# Patient Record
Sex: MALE | NOT HISPANIC OR LATINO | Employment: PART TIME | URBAN - METROPOLITAN AREA
[De-identification: names, ages, dates, MRNs, and addresses within clinical notes are randomized per-mention and may not be internally consistent; named-entity substitution may affect disease eponyms.]

---

## 2023-04-19 LAB
ALANINE AMINOTRANSFERASE (SGPT) (U/L) IN SER/PLAS: 28 U/L (ref 10–52)
ALBUMIN (G/DL) IN SER/PLAS: 4.2 G/DL (ref 3.4–5)
ALKALINE PHOSPHATASE (U/L) IN SER/PLAS: 45 U/L (ref 33–136)
ANION GAP IN SER/PLAS: 12 MMOL/L (ref 10–20)
ASPARTATE AMINOTRANSFERASE (SGOT) (U/L) IN SER/PLAS: 24 U/L (ref 9–39)
BILIRUBIN TOTAL (MG/DL) IN SER/PLAS: 0.4 MG/DL (ref 0–1.2)
CALCIUM (MG/DL) IN SER/PLAS: 9.1 MG/DL (ref 8.6–10.3)
CARBON DIOXIDE, TOTAL (MMOL/L) IN SER/PLAS: 27 MMOL/L (ref 21–32)
CHLORIDE (MMOL/L) IN SER/PLAS: 97 MMOL/L (ref 98–107)
CREATININE (MG/DL) IN SER/PLAS: 0.87 MG/DL (ref 0.5–1.3)
ESTIMATED AVERAGE GLUCOSE FOR HBA1C: 206 MG/DL
GFR MALE: >90 ML/MIN/1.73M2
GLUCOSE (MG/DL) IN SER/PLAS: 141 MG/DL (ref 74–99)
HEMOGLOBIN A1C/HEMOGLOBIN TOTAL IN BLOOD: 8.8 %
POTASSIUM (MMOL/L) IN SER/PLAS: 4.2 MMOL/L (ref 3.5–5.3)
PROTEIN TOTAL: 7.6 G/DL (ref 6.4–8.2)
SODIUM (MMOL/L) IN SER/PLAS: 132 MMOL/L (ref 136–145)
UREA NITROGEN (MG/DL) IN SER/PLAS: 10 MG/DL (ref 6–23)

## 2023-10-05 ENCOUNTER — HOSPITAL ENCOUNTER (EMERGENCY)
Facility: HOSPITAL | Age: 63
Discharge: HOME | End: 2023-10-05
Attending: EMERGENCY MEDICINE | Admitting: EMERGENCY MEDICINE
Payer: COMMERCIAL

## 2023-10-05 VITALS
SYSTOLIC BLOOD PRESSURE: 124 MMHG | HEART RATE: 74 BPM | WEIGHT: 245 LBS | HEIGHT: 72 IN | TEMPERATURE: 98.1 F | DIASTOLIC BLOOD PRESSURE: 80 MMHG | OXYGEN SATURATION: 98 % | RESPIRATION RATE: 18 BRPM | BODY MASS INDEX: 33.18 KG/M2

## 2023-10-05 DIAGNOSIS — T63.441A BEE STING, ACCIDENTAL OR UNINTENTIONAL, INITIAL ENCOUNTER: Primary | ICD-10-CM

## 2023-10-05 PROCEDURE — 99281 EMR DPT VST MAYX REQ PHY/QHP: CPT | Performed by: EMERGENCY MEDICINE

## 2023-10-05 ASSESSMENT — PAIN SCALES - GENERAL
PAINLEVEL_OUTOF10: 0 - NO PAIN
PAINLEVEL_OUTOF10: 0 - NO PAIN

## 2023-10-05 ASSESSMENT — LIFESTYLE VARIABLES
HAVE PEOPLE ANNOYED YOU BY CRITICIZING YOUR DRINKING: NO
HAVE YOU EVER FELT YOU SHOULD CUT DOWN ON YOUR DRINKING: NO
EVER FELT BAD OR GUILTY ABOUT YOUR DRINKING: NO
EVER HAD A DRINK FIRST THING IN THE MORNING TO STEADY YOUR NERVES TO GET RID OF A HANGOVER: NO

## 2023-10-05 ASSESSMENT — COLUMBIA-SUICIDE SEVERITY RATING SCALE - C-SSRS
1. IN THE PAST MONTH, HAVE YOU WISHED YOU WERE DEAD OR WISHED YOU COULD GO TO SLEEP AND NOT WAKE UP?: NO
2. HAVE YOU ACTUALLY HAD ANY THOUGHTS OF KILLING YOURSELF?: NO
6. HAVE YOU EVER DONE ANYTHING, STARTED TO DO ANYTHING, OR PREPARED TO DO ANYTHING TO END YOUR LIFE?: NO

## 2023-10-05 ASSESSMENT — PAIN - FUNCTIONAL ASSESSMENT: PAIN_FUNCTIONAL_ASSESSMENT: 0-10

## 2023-10-05 NOTE — DISCHARGE INSTRUCTIONS
Bee, Wasp, or Hornet Sting, Adult  Bees, wasps, and hornets are part of a family of insects that sting. Normally, a sting will cause pain, redness, and swelling at the sting site. However, some people have an allergy to these stings, and their reactions can be much more serious.    What increases the risk?  You may be at a greater risk of getting stung if you:  Provoke a stinging insect by swatting or disturbing it.  Wear strong-smelling soaps, deodorants, or body sprays.  Spend time outdoors near gardens with flowers or fruit trees or in clothes that expose skin.  Eat or drink outside.  What are the signs or symptoms?  The reaction to an insect sting can vary from a mild, normal response to life-threatening anaphylaxis. The sting site is often a red lump in the skin, sometimes with a tiny hole in the center, that may still have the stinger in the center of the wound.    Normal reaction    A normal reaction is experienced by most people after an insect sting. Symptoms include:  Pain, redness, and swelling at the sting site. These can develop over 24-48 hours.  Pain, redness, and swelling that may spread to a larger, connected area beyond the sting site. The spreading can continue over 24-48 hours.  Allergic reaction    An allergic reaction can vary in severity and includes symptoms in other areas of the body beyond the sting site. People who experience an allergic reaction have a higher risk of having similar or worse symptoms the next time they are stung. Symptoms may include:  Hives, itching, and swelling.  Abdominal symptoms including cramping, nausea, vomiting, and diarrhea.  Severe symptoms that require immediate medical attention include:  Chest pain or tightness.  Wheezing or trouble breathing.  Swelling of the tongue, throat, or lips.  Trouble swallowing or hoarse voice.  Anaphylactic reaction    An anaphylactic reaction is a severe, life-threatening allergy and requires immediate medical attention. The  "symptoms often include severe allergic reaction symptoms that develop rapidly and lead to:  A sudden and sharp drop in blood pressure.  Dizziness.  Loss of consciousness.  How is this diagnosed?  This condition is usually diagnosed based on your symptoms and medical history as well as a physical exam. You may have an allergy test to determine if you are allergic to the insect venom.    How is this treated?  If you were stung by a bee, the stinger and a small sac of venom may be in the wound. Remove the stinger as soon as possible. Do this by brushing across the wound with gauze, a clean fingernail, or a flat card such as a credit card. This can help reduce the severity of your body's reaction to the sting.    Normal reactions can be treated with:  Washing the area thoroughly with soap and water.  Applying ice to the area to reduce swelling.  Oral or topical medicines to help reduce pain and itching, if present.  Pay close attention to your symptoms after you have been stung. If possible, have someone stay with you to see if an allergic reaction develops. If allergic symptoms develop, oral antihistamines can be taken and you will need medical help right away.    If you had an allergic reaction before, you may need to:  Use an auto-injector \"pen\"(pre-filled automatic epinephrine injection device)at the first sign of an allergic reaction.  Get medical help right away because the epinephrine is short-acting. It is intended to give you more time to get to an emergency room.  Follow these instructions at home:  Bag of ice on a towel on the skin.   Wash the sting site 2-3 times a day with soap and water as told by your health care provider.  Apply or take over-the-counter and prescription medicines only as told by your health care provider.  If directed, apply ice to the sting area.  Put ice in a plastic bag.  Place a towel between your skin and the bag.  Leave the ice on for 20 minutes, 2-3 times a day.  Do not scratch " "the sting area.  If you had a severe allergic reaction to a sting, you may need to:  Wear a medical bracelet or necklace that lists the allergy.  Carry an anaphylaxis kit or an epinephrine auto-injector \"pen\" with you at all times. Tell your family members, friends, and coworkers when and how to use it. Use it at the first sign of an allergic reaction.  How is this prevented?  Avoid swatting at stinging insects and disturbing insect nests.  Do not use fragrant soaps or lotions and avoid sitting near flowering plants, if possible.  Wear shoes, pants, and long sleeves when spending time outdoors, especially in grassy areas where stinging insects are common.  Keep outdoor areas free from nests or hives.  Keep food and drink containers covered when eating outdoors.  Wear gloves if you are gardening or working outdoors.  Find a barrier between you and the insect(s), such as a door, if an attack by a stinging insect or a swarm seems likely.  Contact a health care provider if:  Your symptoms do not get better in 2-3 days.  You have redness, swelling, or pain that spreads beyond the area of the sting.  You have a fever.  Get help right away if:  You have symptoms of a severe allergic reaction. These include:  Chest tightness or pain.  Wheezing, or trouble swallowing or breathing.  Light-headedness, dizziness, or fainting.  Itchy, raised, red patches on the skin beyond the sting site.  Abdominal cramping, nausea, vomiting, or diarrhea.  Trouble swallowing or a swollen tongue, throat, or lips.  These symptoms may be an emergency. Get help right away. Call 911.  Do not wait to see if the symptoms will go away.  Do not drive yourself to the hospital.  Summary  Stings from bees, wasps, and hornets can cause pain and swelling, but they are usually not serious. However, some people may have an allergic reaction to a sting. This can cause the symptoms to be more severe.  Pay close attention to your symptoms after you have been " stung. If possible, have someone stay with you to look for signs of worsening symptoms.  Call your health care provider if you have any signs of an allergic reaction.  This information is not intended to replace advice given to you by your health care provider. Make sure you discuss any questions you have with your health care provider.

## 2023-10-05 NOTE — ED PROVIDER NOTES
Insect Bite (Stung 4 times. PT c/o itching/tingling. No CP/SOB)      HPI  Patient says earlier he was stung by a bee just above his right eye.  He says that he was at work and they encouraged him to come in and get evaluated patient has had swelling over the right eye for the last couple hours.  He denies any systemic symptoms no known history of severe life-threatening allergic reaction.  He has no difficulty breathing or swallowing no other symptoms patient came in per his company need to be evaluated but feels like he can be discharged home.    Social history:  Smoker    Physical Exam  Gen.: Vitals noted, no distress. Afebrile   Head: Normocephalic  ENT: Pupils equal, patent nares.  Normal oral mucosa.  Right periorbital area is moderately edematous right superior aspect greater than the inferior aspect slightly erythematous the actual orbit itself is not affected no discharge remainder the face is unremarkable oropharynx is clear no signs of edema no edema's extending into the neck.  Neck: Supple.   Cardiac: Regular rate rhythm   Lungs: Clear to auscultation bilaterally with good aeration  Abdomen: Soft, nontender, nonsurgical.   Back: No midline or paraspinal tenderness.    Extremities:  Moves all extremities.  Skin: No rash.   Neuro: No focal neurologic deficits.    No past medical history on file.  Labs Reviewed - No data to display   ED Course as of 10/05/23 1352   Thu Oct 05, 2023   1348 ED course was uneventful [DD]      ED Course User Index  [DD] Benny Nash MD         Diagnoses as of 10/05/23 1352   Bee sting, accidental or unintentional, initial encounter         Medical Decision Making  Patient with a localized allergic reaction secondary to bee sting do not believe that this is a severe anaphylactic reaction and hence does not need any type of emergent intervention patient will take Benadryl over-the-counter.     Benny Nash MD  10/05/23 1348       Benny Nash MD  10/05/23 1352

## 2023-12-11 DIAGNOSIS — E11.9 TYPE 2 DIABETES MELLITUS WITHOUT COMPLICATION, WITHOUT LONG-TERM CURRENT USE OF INSULIN (MULTI): Primary | ICD-10-CM

## 2023-12-11 RX ORDER — INSULIN GLARGINE AND LIXISENATIDE 100; 33 U/ML; UG/ML
INJECTION, SOLUTION SUBCUTANEOUS
Qty: 30 ML | Refills: 3 | Status: SHIPPED | OUTPATIENT
Start: 2023-12-11 | End: 2024-02-06 | Stop reason: SDUPTHER

## 2023-12-14 ENCOUNTER — TELEPHONE (OUTPATIENT)
Dept: PRIMARY CARE | Facility: CLINIC | Age: 63
End: 2023-12-14
Payer: COMMERCIAL

## 2023-12-14 DIAGNOSIS — G62.9 NEUROPATHY: ICD-10-CM

## 2023-12-14 DIAGNOSIS — E11.9 TYPE 2 DIABETES MELLITUS WITHOUT COMPLICATION, WITHOUT LONG-TERM CURRENT USE OF INSULIN (MULTI): Primary | ICD-10-CM

## 2023-12-15 ENCOUNTER — LAB (OUTPATIENT)
Dept: LAB | Facility: LAB | Age: 63
End: 2023-12-15
Payer: COMMERCIAL

## 2023-12-15 DIAGNOSIS — G62.9 NEUROPATHY: ICD-10-CM

## 2023-12-15 DIAGNOSIS — E11.9 TYPE 2 DIABETES MELLITUS WITHOUT COMPLICATION, WITHOUT LONG-TERM CURRENT USE OF INSULIN (MULTI): ICD-10-CM

## 2023-12-15 LAB
ALBUMIN SERPL BCP-MCNC: 4.3 G/DL (ref 3.4–5)
ALP SERPL-CCNC: 46 U/L (ref 33–136)
ALT SERPL W P-5'-P-CCNC: 22 U/L (ref 10–52)
ANION GAP SERPL CALC-SCNC: 14 MMOL/L (ref 10–20)
AST SERPL W P-5'-P-CCNC: 18 U/L (ref 9–39)
BILIRUB SERPL-MCNC: 0.5 MG/DL (ref 0–1.2)
BUN SERPL-MCNC: 14 MG/DL (ref 6–23)
CALCIUM SERPL-MCNC: 9.2 MG/DL (ref 8.6–10.3)
CHLORIDE SERPL-SCNC: 98 MMOL/L (ref 98–107)
CO2 SERPL-SCNC: 28 MMOL/L (ref 21–32)
CREAT SERPL-MCNC: 1.07 MG/DL (ref 0.5–1.3)
EST. AVERAGE GLUCOSE BLD GHB EST-MCNC: 197 MG/DL
GFR SERPL CREATININE-BSD FRML MDRD: 79 ML/MIN/1.73M*2
GLUCOSE SERPL-MCNC: 152 MG/DL (ref 74–99)
HBA1C MFR BLD: 8.5 %
POTASSIUM SERPL-SCNC: 4.8 MMOL/L (ref 3.5–5.3)
PROT SERPL-MCNC: 7.5 G/DL (ref 6.4–8.2)
SODIUM SERPL-SCNC: 135 MMOL/L (ref 136–145)
TSH SERPL-ACNC: 1.93 MIU/L (ref 0.44–3.98)

## 2023-12-15 PROCEDURE — 36415 COLL VENOUS BLD VENIPUNCTURE: CPT

## 2023-12-15 PROCEDURE — 83036 HEMOGLOBIN GLYCOSYLATED A1C: CPT

## 2023-12-15 PROCEDURE — 80053 COMPREHEN METABOLIC PANEL: CPT

## 2023-12-15 PROCEDURE — 84443 ASSAY THYROID STIM HORMONE: CPT

## 2024-01-10 DIAGNOSIS — E78.5 HYPERLIPIDEMIA, UNSPECIFIED HYPERLIPIDEMIA TYPE: Primary | ICD-10-CM

## 2024-01-10 RX ORDER — ROSUVASTATIN CALCIUM 10 MG/1
10 TABLET, COATED ORAL DAILY
Qty: 90 TABLET | Refills: 3 | Status: SHIPPED | OUTPATIENT
Start: 2024-01-10 | End: 2024-02-06 | Stop reason: SDUPTHER

## 2024-01-16 DIAGNOSIS — F32.A DEPRESSION, UNSPECIFIED DEPRESSION TYPE: Primary | ICD-10-CM

## 2024-01-16 RX ORDER — CITALOPRAM 20 MG/1
20 TABLET, FILM COATED ORAL DAILY
Qty: 60 TABLET | Refills: 5 | Status: SHIPPED | OUTPATIENT
Start: 2024-01-16 | End: 2024-02-06 | Stop reason: SDUPTHER

## 2024-02-01 DIAGNOSIS — E11.9 TYPE 2 DIABETES MELLITUS WITHOUT COMPLICATION, UNSPECIFIED WHETHER LONG TERM INSULIN USE (MULTI): Primary | ICD-10-CM

## 2024-02-01 RX ORDER — PEN NEEDLE, DIABETIC 31 GX5/16"
NEEDLE, DISPOSABLE MISCELLANEOUS DAILY
Qty: 100 EACH | Refills: 3 | Status: SHIPPED | OUTPATIENT
Start: 2024-02-01 | End: 2024-02-06 | Stop reason: SDUPTHER

## 2024-02-01 RX ORDER — PEN NEEDLE, DIABETIC 31 GX5/16"
NEEDLE, DISPOSABLE MISCELLANEOUS DAILY
COMMUNITY
Start: 2023-11-01

## 2024-02-04 DIAGNOSIS — E11.9 TYPE 2 DIABETES MELLITUS WITHOUT COMPLICATION, WITHOUT LONG-TERM CURRENT USE OF INSULIN (MULTI): Primary | ICD-10-CM

## 2024-02-05 RX ORDER — METFORMIN HYDROCHLORIDE 500 MG/1
500 TABLET, EXTENDED RELEASE ORAL 2 TIMES DAILY
Qty: 120 TABLET | Refills: 1 | Status: SHIPPED | OUTPATIENT
Start: 2024-02-05 | End: 2024-02-06 | Stop reason: SDUPTHER

## 2024-02-06 ENCOUNTER — OFFICE VISIT (OUTPATIENT)
Dept: PRIMARY CARE | Facility: CLINIC | Age: 64
End: 2024-02-06
Payer: COMMERCIAL

## 2024-02-06 VITALS
HEART RATE: 71 BPM | OXYGEN SATURATION: 97 % | DIASTOLIC BLOOD PRESSURE: 91 MMHG | WEIGHT: 245.8 LBS | HEIGHT: 72 IN | BODY MASS INDEX: 33.29 KG/M2 | TEMPERATURE: 98 F | SYSTOLIC BLOOD PRESSURE: 144 MMHG

## 2024-02-06 DIAGNOSIS — F32.A DEPRESSION, UNSPECIFIED DEPRESSION TYPE: ICD-10-CM

## 2024-02-06 DIAGNOSIS — F17.219 CIGARETTE NICOTINE DEPENDENCE WITH NICOTINE-INDUCED DISORDER: ICD-10-CM

## 2024-02-06 DIAGNOSIS — E78.5 HYPERLIPIDEMIA, UNSPECIFIED HYPERLIPIDEMIA TYPE: ICD-10-CM

## 2024-02-06 DIAGNOSIS — E11.9 TYPE 2 DIABETES MELLITUS WITHOUT COMPLICATION, UNSPECIFIED WHETHER LONG TERM INSULIN USE (MULTI): ICD-10-CM

## 2024-02-06 DIAGNOSIS — Z00.00 ENCOUNTER FOR HEALTH MAINTENANCE EXAMINATION IN ADULT: ICD-10-CM

## 2024-02-06 DIAGNOSIS — E11.9 TYPE 2 DIABETES MELLITUS WITHOUT COMPLICATION, WITHOUT LONG-TERM CURRENT USE OF INSULIN (MULTI): ICD-10-CM

## 2024-02-06 DIAGNOSIS — Z12.11 COLON CANCER SCREENING: Primary | ICD-10-CM

## 2024-02-06 PROBLEM — L98.499 CALLOUS ULCER (MULTI): Status: ACTIVE | Noted: 2024-02-06

## 2024-02-06 PROBLEM — R91.1 SOLITARY PULMONARY NODULE: Status: ACTIVE | Noted: 2024-02-06

## 2024-02-06 PROBLEM — H93.19 TINNITUS: Status: ACTIVE | Noted: 2024-02-06

## 2024-02-06 PROBLEM — H91.93 BILATERAL HEARING LOSS: Status: ACTIVE | Noted: 2024-02-06

## 2024-02-06 PROBLEM — F41.9 ANXIETY: Status: ACTIVE | Noted: 2024-02-06

## 2024-02-06 PROCEDURE — 3008F BODY MASS INDEX DOCD: CPT | Performed by: FAMILY MEDICINE

## 2024-02-06 PROCEDURE — 3077F SYST BP >= 140 MM HG: CPT | Performed by: FAMILY MEDICINE

## 2024-02-06 PROCEDURE — 99396 PREV VISIT EST AGE 40-64: CPT | Performed by: FAMILY MEDICINE

## 2024-02-06 PROCEDURE — 3080F DIAST BP >= 90 MM HG: CPT | Performed by: FAMILY MEDICINE

## 2024-02-06 RX ORDER — INSULIN GLARGINE AND LIXISENATIDE 100; 33 U/ML; UG/ML
INJECTION, SOLUTION SUBCUTANEOUS
Qty: 30 ML | Refills: 3 | Status: SHIPPED | OUTPATIENT
Start: 2024-02-06

## 2024-02-06 RX ORDER — BLOOD-GLUCOSE SENSOR
EACH MISCELLANEOUS
Qty: 1 EACH | Refills: 3 | Status: SHIPPED | OUTPATIENT
Start: 2024-02-06 | End: 2024-02-11

## 2024-02-06 RX ORDER — PEN NEEDLE, DIABETIC 30 GX3/16"
1 NEEDLE, DISPOSABLE MISCELLANEOUS DAILY
Qty: 100 EACH | Refills: 3 | Status: SHIPPED | OUTPATIENT
Start: 2024-02-06

## 2024-02-06 RX ORDER — ROSUVASTATIN CALCIUM 10 MG/1
10 TABLET, COATED ORAL DAILY
Qty: 90 TABLET | Refills: 3 | Status: SHIPPED | OUTPATIENT
Start: 2024-02-06

## 2024-02-06 RX ORDER — METHOCARBAMOL 750 MG/1
TABLET, FILM COATED ORAL
COMMUNITY
Start: 2023-07-21

## 2024-02-06 RX ORDER — CITALOPRAM 20 MG/1
20 TABLET, FILM COATED ORAL DAILY
Qty: 60 TABLET | Refills: 5 | Status: SHIPPED | OUTPATIENT
Start: 2024-02-06

## 2024-02-06 RX ORDER — BLOOD-GLUCOSE SENSOR
EACH MISCELLANEOUS
Qty: 1 EACH | Refills: 3 | Status: SHIPPED | OUTPATIENT
Start: 2024-02-06 | End: 2024-02-06

## 2024-02-06 RX ORDER — METFORMIN HYDROCHLORIDE 500 MG/1
500 TABLET, EXTENDED RELEASE ORAL 2 TIMES DAILY
Qty: 120 TABLET | Refills: 1 | Status: SHIPPED | OUTPATIENT
Start: 2024-02-06

## 2024-02-06 ASSESSMENT — PATIENT HEALTH QUESTIONNAIRE - PHQ9
5. POOR APPETITE OR OVEREATING: NEARLY EVERY DAY
9. THOUGHTS THAT YOU WOULD BE BETTER OFF DEAD, OR OF HURTING YOURSELF: NOT AT ALL
8. MOVING OR SPEAKING SO SLOWLY THAT OTHER PEOPLE COULD HAVE NOTICED. OR THE OPPOSITE, BEING SO FIGETY OR RESTLESS THAT YOU HAVE BEEN MOVING AROUND A LOT MORE THAN USUAL: NOT AT ALL
10. IF YOU CHECKED OFF ANY PROBLEMS, HOW DIFFICULT HAVE THESE PROBLEMS MADE IT FOR YOU TO DO YOUR WORK, TAKE CARE OF THINGS AT HOME, OR GET ALONG WITH OTHER PEOPLE: SOMEWHAT DIFFICULT
7. TROUBLE CONCENTRATING ON THINGS, SUCH AS READING THE NEWSPAPER OR WATCHING TELEVISION: NOT AT ALL
2. FEELING DOWN, DEPRESSED OR HOPELESS: SEVERAL DAYS
SUM OF ALL RESPONSES TO PHQ QUESTIONS 1-9: 13
3. TROUBLE FALLING OR STAYING ASLEEP OR SLEEPING TOO MUCH: NEARLY EVERY DAY
4. FEELING TIRED OR HAVING LITTLE ENERGY: MORE THAN HALF THE DAYS
SUM OF ALL RESPONSES TO PHQ9 QUESTIONS 1 AND 2: 4
6. FEELING BAD ABOUT YOURSELF - OR THAT YOU ARE A FAILURE OR HAVE LET YOURSELF OR YOUR FAMILY DOWN: SEVERAL DAYS
1. LITTLE INTEREST OR PLEASURE IN DOING THINGS: NEARLY EVERY DAY

## 2024-02-06 ASSESSMENT — PAIN SCALES - GENERAL: PAINLEVEL: 0-NO PAIN

## 2024-02-06 NOTE — PROGRESS NOTES
"Subjective   Patient ID: Curtis Shah is a 64 y.o. male who presents for Annual Exam (Physical and bloodwork had recent bloodwork not sure if its what he needs for physical).    HPI  TIIDM, insulin dependent   Last A1c was 8.5%  Patient takes 20 units of Soliqua and metformin 500 mg twice daily, however patient sometimes only takes 500 mg once daily  Patient needs refills on his medication    2. Hyperlipidemia  Patient taking rosuvastatin 10mg   Patient tolerating well     3.  Depression  Doing well on citalopram 20 mg    4. Preventative:  Working on a making diet changes.  Does not exercise regularly.  Due for colon cancer screening. Did Cologuard over 5 years ago  Vaccines are not up to date; they are due for: Declines shingles, Flu  Dental exam is up to date.  Vision exam is up to date.    Social history:  -Smokin pack year smoking history  -Alcohol: Rare alcohol use  -Occupation: Retired, worked for [employer]    Review of Systems  All pertinent positive symptoms are included in the history of present illness.    All other systems have been reviewed and are negative and noncontributory to this patient's current ailments.     Social History     Tobacco Use    Smoking status: Every Day     Packs/day: 1     Types: Cigarettes    Smokeless tobacco: Never   Substance Use Topics    Alcohol use: Not Currently      No past surgical history on file.   No Known Allergies     Current Outpatient Medications   Medication Sig Dispense Refill    methocarbamol (Robaxin) 750 mg tablet TAKE 1 TABLET BY MOUTH EVERY 6 HOURS AS NEEDED FOR UP TO 3 DAYS.      BD Ultra-Fine Short Pen Needle 31 gauge x 5/16\" needle USE ONCE DAILY 100 each 3    BD Ultra-Fine Short Pen Needle 31 gauge x 5/16\" needle once daily.      citalopram (CeleXA) 20 mg tablet TAKE 1 TABLET BY MOUTH DAILY 60 tablet 5    insulin glargine-lixisenatide (Soliqua 100/33) 100 unit-33 mcg/mL insulin pen INJECT 15 UNITS ONCE DAILY 30 mL 3    metFORMIN  mg 24 hr " tablet TAKE 1 TABLET BY MOUTH TWICE A  tablet 1    rosuvastatin (Crestor) 10 mg tablet TAKE 1 TABLET BY MOUTH DAILY 90 tablet 3     No current facility-administered medications for this visit.        Patient Active Problem List   Diagnosis    Anxiety    Bilateral hearing loss    Callous ulcer (CMS/HCC)    Depressive disorder    Dyslipidemia    Hyperlipidemia    Solitary pulmonary nodule    Tinnitus    Type 2 diabetes mellitus (CMS/HCC)        There is no immunization history on file for this patient.    Objective   VITALS  BP (!) 144/91   Pulse 71   Temp 36.7 °C (98 °F)   Ht 1.829 m (6')   Wt 111 kg (245 lb 12.8 oz)   SpO2 97%   BMI 33.34 kg/m²      Lab Results   Component Value Date    WBC 7.9 01/23/2023    HGB 16.0 01/23/2023    HCT 45.8 01/23/2023    MCV 94 01/23/2023     01/23/2023      Lab Results   Component Value Date    GLUCOSE 152 (H) 12/15/2023    CALCIUM 9.2 12/15/2023     (L) 12/15/2023    K 4.8 12/15/2023    CO2 28 12/15/2023    CL 98 12/15/2023    BUN 14 12/15/2023    CREATININE 1.07 12/15/2023     Lab Results   Component Value Date    CHOL 193 01/23/2023    CHOL 200 08/17/2021    CHOL 161 06/13/2018     Lab Results   Component Value Date    HDL 36.5 (A) 01/23/2023    HDL 40 (L) 08/17/2021    HDL 38.7 (A) 06/13/2018     Lab Results   Component Value Date    LDLCALC 120 08/17/2021     Lab Results   Component Value Date    TRIG 236 (H) 01/23/2023    TRIG 199 (H) 08/17/2021    TRIG 132 06/13/2018     Physical Exam  CONSTITUTIONAL - In no acute distress, not ill-appearing, and not tired appearing  SKIN - normal skin color and pigmentation, normal skin turgor without rash, lesions, or nodules visualized  HEAD - no trauma, normocephalic  EYES - PERRL, EOMI  ENT - TM's intact, no injection or signs of infection, uvula midline, normal tongue movement and throat normal, no exudate, nasal passage without discharge and patent  NECK - supple without rigidity, no neck mass was observed, no  "thyromegaly or thyroid nodules  PULMONARY - clear to auscultation, no wheezing, no crackles and no rales, normal respiratory effort  CARDIAC - regular rate and regular rhythm, no skipped beats, no murmur  ABDOMEN - no organomegaly, soft, nontender, nondistended, normal bowel sounds, no guarding/rebound/rigidity  EXTREMITIES - no edema, no deformities  NEUROLOGICAL - alert, oriented and no focal deficits  PSYCHIATRIC - normal mood and behavior  IMMUNOLOGIC - no cervical lymphadenopathy     Assessment/Plan   Curtis is a 64-year-old male with history of insulin-dependent type 2 diabetes, hyperlipidemia, depression, nicotine dependence who presents today for annual health maintenance exam.    Problem List Items Addressed This Visit             ICD-10-CM    Hyperlipidemia  Patient tolerating rosuvastatin well  Patient had lab work last month but it did not include lipid panel, order for lipid panel placed   Sent refills in for rosuvastatin 10 mg E78.5    Relevant Medications    rosuvastatin (Crestor) 10 mg tablet    Other Relevant Orders    Lipid Panel    Type 2 diabetes mellitus (CMS/[facility])  Patient with history of insulin-dependent type 2 diabetes  Patient takes 20 units of Soliqua at night  Patient is also meant to be taking 1000 mg of metformin daily, but only takes 500 mg  Last A1c was 8.5%, not well-controlled, target is less than 7%  We will add Jardiance 10 mg daily and have patient follow-up for repeat A1c in 3 months E11.9    Relevant Medications    metFORMIN  mg 24 hr tablet    insulin glargine-lixisenatide (Soliqua 100/33) 100 unit-33 mcg/mL insulin pen    empagliflozin (Jardiance) 10 mg    pen needle, diabetic (BD Ultra-Fine Short Pen Needle) 31 gauge x 5/16\" needle    Dexcom G7 Sensor device     Other Visit Diagnoses         Codes    Colon cancer screening    -  Primary Z12.11    Relevant Orders    Cologuard® colon cancer screening    Depression, unspecified depression type     Stable  Sent in refills " for citalopram 20 mg F32.A    Relevant Medications    citalopram (CeleXA) 20 mg tablet    Cigarette nicotine dependence with nicotine-induced disorder      Patient is due for an annual low-dose CT lung screening F17.219    Relevant Orders    CT lung screening low dose    Encounter for health maintenance examination in adult     Z00.00    BMI 33.0-33.9,adult     Z68.33    Relevant Orders    CBC and Auto Differential   No red flags  RTC in 3 months to follow-up on type 2 diabetes after starting Jardiance, we will recheck A1c at that time    Patient was staffed with attending physician Dr. Bronson.    Ina Eisenberg, DO  Family Medicine, PGY-2

## 2024-02-06 NOTE — PROGRESS NOTES
I reviewed and examined the patient. I was present for the key exam elements, and I fully participated in the patient's care. I discussed the management of the care with the resident. I have personally reviewed the pertinent labs and imaging, as well as recent notes, with the patient. I have reviewed the note above and agree with the resident's medical decision making as documented in the resident's note, in addition to the following comments / findings:     Agree with the rest of the plan outlined below by resident physician. No red flags.      The patient understands and agrees to the assessment and plan of care. Patient has also agreed to follow up and comply with the treatment and evaluation as recommended today. Patient was instructed to call the office at [phone] should questions arise regarding their treatment or care.     Danny Bronson DO, FAOASM  Family Medicine   [address], [address]  [place] [address]     Danny Bronson DO

## 2024-02-09 DIAGNOSIS — E11.9 TYPE 2 DIABETES MELLITUS WITHOUT COMPLICATION, UNSPECIFIED WHETHER LONG TERM INSULIN USE (MULTI): ICD-10-CM

## 2024-02-11 RX ORDER — BLOOD-GLUCOSE SENSOR
EACH MISCELLANEOUS
Qty: 1 EACH | Refills: 3 | Status: SHIPPED | OUTPATIENT
Start: 2024-02-11

## 2024-02-23 ENCOUNTER — HOSPITAL ENCOUNTER (OUTPATIENT)
Dept: RADIOLOGY | Facility: HOSPITAL | Age: 64
Discharge: HOME | End: 2024-02-23
Payer: COMMERCIAL

## 2024-02-23 ENCOUNTER — LAB (OUTPATIENT)
Dept: LAB | Facility: LAB | Age: 64
End: 2024-02-23
Payer: COMMERCIAL

## 2024-02-23 DIAGNOSIS — F17.219 CIGARETTE NICOTINE DEPENDENCE WITH NICOTINE-INDUCED DISORDER: ICD-10-CM

## 2024-02-23 DIAGNOSIS — E78.5 HYPERLIPIDEMIA, UNSPECIFIED HYPERLIPIDEMIA TYPE: ICD-10-CM

## 2024-02-23 LAB
BASOPHILS # BLD AUTO: 0.04 X10*3/UL (ref 0–0.1)
BASOPHILS NFR BLD AUTO: 0.6 %
CHOLEST SERPL-MCNC: 96 MG/DL (ref 0–199)
CHOLESTEROL/HDL RATIO: 2.7
EOSINOPHIL # BLD AUTO: 0.1 X10*3/UL (ref 0–0.7)
EOSINOPHIL NFR BLD AUTO: 1.4 %
ERYTHROCYTE [DISTWIDTH] IN BLOOD BY AUTOMATED COUNT: 12.6 % (ref 11.5–14.5)
HCT VFR BLD AUTO: 47.1 % (ref 41–52)
HDLC SERPL-MCNC: 35.5 MG/DL
HGB BLD-MCNC: 15.8 G/DL (ref 13.5–17.5)
IMM GRANULOCYTES # BLD AUTO: 0.02 X10*3/UL (ref 0–0.7)
IMM GRANULOCYTES NFR BLD AUTO: 0.3 % (ref 0–0.9)
LDLC SERPL CALC-MCNC: 37 MG/DL
LYMPHOCYTES # BLD AUTO: 1.98 X10*3/UL (ref 1.2–4.8)
LYMPHOCYTES NFR BLD AUTO: 28 %
MCH RBC QN AUTO: 33.1 PG (ref 26–34)
MCHC RBC AUTO-ENTMCNC: 33.5 G/DL (ref 32–36)
MCV RBC AUTO: 99 FL (ref 80–100)
MONOCYTES # BLD AUTO: 0.68 X10*3/UL (ref 0.1–1)
MONOCYTES NFR BLD AUTO: 9.6 %
NEUTROPHILS # BLD AUTO: 4.25 X10*3/UL (ref 1.2–7.7)
NEUTROPHILS NFR BLD AUTO: 60.1 %
NON HDL CHOLESTEROL: 61 MG/DL (ref 0–149)
NRBC BLD-RTO: 0 /100 WBCS (ref 0–0)
PLATELET # BLD AUTO: 206 X10*3/UL (ref 150–450)
RBC # BLD AUTO: 4.77 X10*6/UL (ref 4.5–5.9)
TRIGL SERPL-MCNC: 119 MG/DL (ref 0–149)
VLDL: 24 MG/DL (ref 0–40)
WBC # BLD AUTO: 7.1 X10*3/UL (ref 4.4–11.3)

## 2024-02-23 PROCEDURE — 36415 COLL VENOUS BLD VENIPUNCTURE: CPT

## 2024-02-23 PROCEDURE — 85025 COMPLETE CBC W/AUTO DIFF WBC: CPT

## 2024-02-23 PROCEDURE — 80061 LIPID PANEL: CPT

## 2024-02-23 PROCEDURE — 71271 CT THORAX LUNG CANCER SCR C-: CPT

## 2024-05-07 ENCOUNTER — OFFICE VISIT (OUTPATIENT)
Dept: PRIMARY CARE | Facility: CLINIC | Age: 64
End: 2024-05-07
Payer: COMMERCIAL

## 2024-05-07 VITALS
DIASTOLIC BLOOD PRESSURE: 70 MMHG | HEIGHT: 72 IN | HEART RATE: 73 BPM | WEIGHT: 238.2 LBS | BODY MASS INDEX: 32.26 KG/M2 | SYSTOLIC BLOOD PRESSURE: 110 MMHG | OXYGEN SATURATION: 98 % | TEMPERATURE: 97.1 F

## 2024-05-07 DIAGNOSIS — E11.9 TYPE 2 DIABETES MELLITUS WITHOUT COMPLICATION, WITHOUT LONG-TERM CURRENT USE OF INSULIN (MULTI): ICD-10-CM

## 2024-05-07 DIAGNOSIS — E11.9 TYPE 2 DIABETES MELLITUS WITHOUT COMPLICATION, WITH LONG-TERM CURRENT USE OF INSULIN (MULTI): Primary | ICD-10-CM

## 2024-05-07 DIAGNOSIS — Z79.4 TYPE 2 DIABETES MELLITUS WITHOUT COMPLICATION, WITH LONG-TERM CURRENT USE OF INSULIN (MULTI): Primary | ICD-10-CM

## 2024-05-07 PROCEDURE — 3078F DIAST BP <80 MM HG: CPT | Performed by: FAMILY MEDICINE

## 2024-05-07 PROCEDURE — 99213 OFFICE O/P EST LOW 20 MIN: CPT | Performed by: FAMILY MEDICINE

## 2024-05-07 PROCEDURE — 3074F SYST BP LT 130 MM HG: CPT | Performed by: FAMILY MEDICINE

## 2024-05-07 PROCEDURE — 3048F LDL-C <100 MG/DL: CPT | Performed by: FAMILY MEDICINE

## 2024-05-07 PROCEDURE — 3008F BODY MASS INDEX DOCD: CPT | Performed by: FAMILY MEDICINE

## 2024-05-07 RX ORDER — EMPAGLIFLOZIN 10 MG/1
10 TABLET, FILM COATED ORAL DAILY
Qty: 30 TABLET | Refills: 2 | Status: SHIPPED | OUTPATIENT
Start: 2024-05-07

## 2024-05-07 ASSESSMENT — ENCOUNTER SYMPTOMS
LIGHT-HEADEDNESS: 0
CONSTIPATION: 0
DIARRHEA: 0
FREQUENCY: 1
SHORTNESS OF BREATH: 0
NAUSEA: 0
DIFFICULTY URINATING: 0
FATIGUE: 0
VOMITING: 0
NUMBNESS: 0
DYSURIA: 0
HEADACHES: 0
DIZZINESS: 0
WHEEZING: 0

## 2024-05-07 ASSESSMENT — PAIN SCALES - GENERAL: PAINLEVEL: 6

## 2024-05-07 ASSESSMENT — PATIENT HEALTH QUESTIONNAIRE - PHQ9
1. LITTLE INTEREST OR PLEASURE IN DOING THINGS: NOT AT ALL
SUM OF ALL RESPONSES TO PHQ9 QUESTIONS 1 AND 2: 0
2. FEELING DOWN, DEPRESSED OR HOPELESS: NOT AT ALL

## 2024-05-07 NOTE — PROGRESS NOTES
I reviewed and examined the patient. I was present for the key exam elements, and I fully participated in the patient's care. I discussed the management of the care with the resident. I have personally reviewed the pertinent labs and imaging, as well as recent notes, with the patient. I have reviewed the note above and agree with the resident's medical decision making as documented in the resident's note, in addition to the following comments / findings:     Agree with the rest of the plan outlined below by resident physician. No red flags.      The patient understands and agrees to the assessment and plan of care. Patient has also agreed to follow up and comply with the treatment and evaluation as recommended today. Patient was instructed to call the office at [phone] should questions arise regarding their treatment or care.     Danny Bronson DO, FAOASM  Family Medicine   [address], [address]  [place] [address]     Danny Bronson DO

## 2024-05-07 NOTE — PROGRESS NOTES
Subjective   Patient ID: Curtis Shah is a 64 y.o. male who presents for Follow-up (Follow up for  3 month check for diabetes).    HPI   TIIDM  20 units Insulin in the morning  Metformin 500mg BID, but patient often forgets to take the nighttime dose  Jardiance 10mg daily, denying any side effects  Last A1c 8.5%    Review of Systems   Constitutional:  Negative for fatigue.   Eyes:  Negative for visual disturbance.   Respiratory:  Negative for shortness of breath and wheezing.    Cardiovascular:  Negative for chest pain.   Gastrointestinal:  Negative for constipation, diarrhea, nausea and vomiting.   Genitourinary:  Positive for frequency. Negative for difficulty urinating, dysuria and urgency.   Neurological:  Negative for dizziness, light-headedness, numbness and headaches.     Objective   /70   Pulse 73   Temp 36.2 °C (97.1 °F)   Ht 1.829 m (6')   Wt 108 kg (238 lb 3.2 oz)   SpO2 98%   BMI 32.31 kg/m²     Physical Exam  Constitutional:       General: He is not in acute distress.     Appearance: Normal appearance. He is obese.   HENT:      Head: Normocephalic and atraumatic.   Eyes:      Extraocular Movements: Extraocular movements intact.   Cardiovascular:      Rate and Rhythm: Normal rate and regular rhythm.      Heart sounds: No murmur heard.  Pulmonary:      Effort: Pulmonary effort is normal.      Breath sounds: No wheezing, rhonchi or rales.   Abdominal:      General: Abdomen is flat. Bowel sounds are normal. There is no distension.      Palpations: Abdomen is soft.      Tenderness: There is no abdominal tenderness.   Skin:     General: Skin is warm and dry.   Neurological:      General: No focal deficit present.      Mental Status: He is alert.   Psychiatric:         Mood and Affect: Mood normal.         Behavior: Behavior normal.       Assessment/Plan   Curtis is a pleasant 63 yo male with pmh s/f TIIDM (insulin dependence) who presents for diabetes management.     Problem List Items Addressed  This Visit             ICD-10-CM    Type 2 diabetes mellitus (Multi) - Primary  Patient tolerating Jardiance 10 mg well, no side effects  We will recheck his A1c and make any adjustments to medications afterwards  Will also check BMP to monitor electrolyte disturbances since patient has increased urination with Jardiance  Patient having difficulty remembering to take nighttime dose of Metformin 500 mg  Instructed patient to take Metformin 1000 mg at night rather than 500 mg BID  Patient is agreeable to the established plan of care  We will follow up with results of A1c and BMP   E11.9    Relevant Orders    Hemoglobin A1c    Basic metabolic panel     Patient was seen and discussed with attending physician Dr. Bronson.   Ina Eisenberg, DO  Family Medicine, PGY-2

## 2024-06-07 ENCOUNTER — LAB (OUTPATIENT)
Dept: LAB | Facility: LAB | Age: 64
End: 2024-06-07
Payer: COMMERCIAL

## 2024-06-07 DIAGNOSIS — E11.9 TYPE 2 DIABETES MELLITUS WITHOUT COMPLICATION, WITH LONG-TERM CURRENT USE OF INSULIN (MULTI): ICD-10-CM

## 2024-06-07 DIAGNOSIS — Z79.4 TYPE 2 DIABETES MELLITUS WITHOUT COMPLICATION, WITH LONG-TERM CURRENT USE OF INSULIN (MULTI): ICD-10-CM

## 2024-06-07 LAB
ANION GAP SERPL CALC-SCNC: 13 MMOL/L (ref 10–20)
BUN SERPL-MCNC: 15 MG/DL (ref 6–23)
CALCIUM SERPL-MCNC: 9.2 MG/DL (ref 8.6–10.3)
CHLORIDE SERPL-SCNC: 101 MMOL/L (ref 98–107)
CO2 SERPL-SCNC: 27 MMOL/L (ref 21–32)
CREAT SERPL-MCNC: 0.91 MG/DL (ref 0.5–1.3)
EGFRCR SERPLBLD CKD-EPI 2021: >90 ML/MIN/1.73M*2
EST. AVERAGE GLUCOSE BLD GHB EST-MCNC: 160 MG/DL
GLUCOSE SERPL-MCNC: 82 MG/DL (ref 74–99)
HBA1C MFR BLD: 7.2 %
POTASSIUM SERPL-SCNC: 4 MMOL/L (ref 3.5–5.3)
SODIUM SERPL-SCNC: 137 MMOL/L (ref 136–145)

## 2024-06-07 PROCEDURE — 36415 COLL VENOUS BLD VENIPUNCTURE: CPT

## 2024-06-07 PROCEDURE — 83036 HEMOGLOBIN GLYCOSYLATED A1C: CPT

## 2024-06-07 PROCEDURE — 80048 BASIC METABOLIC PNL TOTAL CA: CPT

## 2024-08-16 DIAGNOSIS — E11.9 TYPE 2 DIABETES MELLITUS WITHOUT COMPLICATION, WITHOUT LONG-TERM CURRENT USE OF INSULIN (MULTI): ICD-10-CM

## 2024-08-16 RX ORDER — EMPAGLIFLOZIN 10 MG/1
10 TABLET, FILM COATED ORAL DAILY
Qty: 30 TABLET | Refills: 2 | OUTPATIENT
Start: 2024-08-16

## 2024-08-22 DIAGNOSIS — E11.9 TYPE 2 DIABETES MELLITUS WITHOUT COMPLICATION, WITHOUT LONG-TERM CURRENT USE OF INSULIN (MULTI): ICD-10-CM

## 2024-08-22 NOTE — TELEPHONE ENCOUNTER
Not sure if you want to send this now?  Or wait until Doc gets back?  Dr. Bronson decline patients Jardiance refill request as a duplicate.  It is not a duplicate.      Patient is out of medication and Dr. Singh is going to get samples for patient until Dr. Bronson gets back and can get the prescription sent in for him.

## 2024-09-20 ENCOUNTER — LAB (OUTPATIENT)
Dept: LAB | Facility: LAB | Age: 64
End: 2024-09-20
Payer: COMMERCIAL

## 2024-09-20 DIAGNOSIS — E11.9 TYPE 2 DIABETES MELLITUS WITHOUT COMPLICATION, WITH LONG-TERM CURRENT USE OF INSULIN (MULTI): ICD-10-CM

## 2024-09-20 DIAGNOSIS — Z79.4 TYPE 2 DIABETES MELLITUS WITHOUT COMPLICATION, WITH LONG-TERM CURRENT USE OF INSULIN (MULTI): ICD-10-CM

## 2024-09-20 LAB
EST. AVERAGE GLUCOSE BLD GHB EST-MCNC: 148 MG/DL
HBA1C MFR BLD: 6.8 %

## 2024-09-20 PROCEDURE — 36415 COLL VENOUS BLD VENIPUNCTURE: CPT

## 2024-09-20 PROCEDURE — 83036 HEMOGLOBIN GLYCOSYLATED A1C: CPT

## 2024-12-01 DIAGNOSIS — E11.9 TYPE 2 DIABETES MELLITUS WITHOUT COMPLICATION, WITHOUT LONG-TERM CURRENT USE OF INSULIN (MULTI): ICD-10-CM

## 2024-12-05 RX ORDER — EMPAGLIFLOZIN 10 MG/1
10 TABLET, FILM COATED ORAL DAILY
Qty: 90 TABLET | Refills: 2 | Status: SHIPPED | OUTPATIENT
Start: 2024-12-05

## 2025-02-13 DIAGNOSIS — F32.A DEPRESSION, UNSPECIFIED DEPRESSION TYPE: ICD-10-CM

## 2025-02-13 RX ORDER — CITALOPRAM 20 MG/1
20 TABLET, FILM COATED ORAL DAILY
Qty: 90 TABLET | Refills: 1 | Status: SHIPPED | OUTPATIENT
Start: 2025-02-13

## 2025-03-05 DIAGNOSIS — E11.9 TYPE 2 DIABETES MELLITUS WITHOUT COMPLICATION, WITHOUT LONG-TERM CURRENT USE OF INSULIN (MULTI): ICD-10-CM

## 2025-03-05 RX ORDER — INSULIN GLARGINE AND LIXISENATIDE 100; 33 U/ML; UG/ML
INJECTION, SOLUTION SUBCUTANEOUS
Qty: 3 ML | Refills: 2 | Status: SHIPPED | OUTPATIENT
Start: 2025-03-05

## 2025-03-29 DIAGNOSIS — E11.9 TYPE 2 DIABETES MELLITUS WITHOUT COMPLICATION, WITHOUT LONG-TERM CURRENT USE OF INSULIN: ICD-10-CM

## 2025-03-31 RX ORDER — METFORMIN HYDROCHLORIDE 500 MG/1
500 TABLET, EXTENDED RELEASE ORAL 2 TIMES DAILY
Qty: 120 TABLET | Refills: 1 | Status: SHIPPED | OUTPATIENT
Start: 2025-03-31

## 2025-04-01 DIAGNOSIS — E78.5 HYPERLIPIDEMIA, UNSPECIFIED HYPERLIPIDEMIA TYPE: ICD-10-CM

## 2025-04-01 RX ORDER — ROSUVASTATIN CALCIUM 10 MG/1
10 TABLET, COATED ORAL DAILY
Qty: 60 TABLET | Refills: 2 | Status: SHIPPED | OUTPATIENT
Start: 2025-04-01

## 2025-07-03 ENCOUNTER — APPOINTMENT (OUTPATIENT)
Dept: PRIMARY CARE | Facility: CLINIC | Age: 65
End: 2025-07-03
Payer: COMMERCIAL

## 2025-07-03 VITALS
OXYGEN SATURATION: 99 % | WEIGHT: 233 LBS | HEART RATE: 74 BPM | SYSTOLIC BLOOD PRESSURE: 126 MMHG | BODY MASS INDEX: 31.56 KG/M2 | DIASTOLIC BLOOD PRESSURE: 88 MMHG | HEIGHT: 72 IN

## 2025-07-03 DIAGNOSIS — Z00.00 ANNUAL PHYSICAL EXAM: Primary | ICD-10-CM

## 2025-07-03 DIAGNOSIS — Z11.4 SCREENING FOR HUMAN IMMUNODEFICIENCY VIRUS: ICD-10-CM

## 2025-07-03 DIAGNOSIS — M25.562 ACUTE PAIN OF LEFT KNEE: ICD-10-CM

## 2025-07-03 DIAGNOSIS — Z12.2 SCREENING FOR LUNG CANCER: ICD-10-CM

## 2025-07-03 DIAGNOSIS — Z12.11 COLON CANCER SCREENING: ICD-10-CM

## 2025-07-03 DIAGNOSIS — F32.A DEPRESSION, UNSPECIFIED DEPRESSION TYPE: ICD-10-CM

## 2025-07-03 DIAGNOSIS — Z11.59 NEED FOR HEPATITIS C SCREENING TEST: ICD-10-CM

## 2025-07-03 DIAGNOSIS — Z13.220 NEED FOR LIPID SCREENING: ICD-10-CM

## 2025-07-03 DIAGNOSIS — E11.9 TYPE 2 DIABETES MELLITUS WITHOUT COMPLICATION, UNSPECIFIED WHETHER LONG TERM INSULIN USE: ICD-10-CM

## 2025-07-03 PROCEDURE — 3074F SYST BP LT 130 MM HG: CPT

## 2025-07-03 PROCEDURE — 99396 PREV VISIT EST AGE 40-64: CPT

## 2025-07-03 PROCEDURE — 3008F BODY MASS INDEX DOCD: CPT

## 2025-07-03 PROCEDURE — 3079F DIAST BP 80-89 MM HG: CPT

## 2025-07-03 RX ORDER — CITALOPRAM 20 MG/1
20 TABLET ORAL DAILY
Qty: 90 TABLET | Refills: 1 | Status: SHIPPED | OUTPATIENT
Start: 2025-07-03

## 2025-07-03 RX ORDER — PEN NEEDLE, DIABETIC 30 GX3/16"
1 NEEDLE, DISPOSABLE MISCELLANEOUS DAILY
Qty: 100 EACH | Refills: 3 | Status: SHIPPED | OUTPATIENT
Start: 2025-07-03

## 2025-07-03 ASSESSMENT — PATIENT HEALTH QUESTIONNAIRE - PHQ9
SUM OF ALL RESPONSES TO PHQ9 QUESTIONS 1 AND 2: 0
1. LITTLE INTEREST OR PLEASURE IN DOING THINGS: NOT AT ALL
2. FEELING DOWN, DEPRESSED OR HOPELESS: NOT AT ALL

## 2025-07-03 NOTE — PROGRESS NOTES
I reviewed and examined the patient. I was present for the key exam elements, and I fully participated in the patient's care. I discussed the management of the care with the resident. I have personally reviewed the pertinent labs and imaging, as well as recent notes, with the patient. I have reviewed the note above and agree with the resident's medical decision making as documented in the resident's note, in addition to the following comments / findings:     Agree with the rest of the plan outlined below by resident physician. No red flags.      The patient understands and agrees to the assessment and plan of care. Patient has also agreed to follow up and comply with the treatment and evaluation as recommended today. Patient was instructed to call the office at [phone] should questions arise regarding their treatment or care.     Danny Bronson DO, FAOASM  Family Medicine   [address], [address]  [place] [address]     Danny Bronson DO

## 2025-07-03 NOTE — PROGRESS NOTES
Subjective   Patient ID: Curtis Shah is a 65 y.o. male who presents for Annual Exam and Knee Pain (- LEFT).  Today he is accompanied by alone.     HPI  Overall patient is doing well.   Immunization: Tdap: declines  Shingrix: declines    PCV: declines  COVID-19 vaccine: declines  Colon Cancer Screening: no family history. Last completed many years ago. Due for repeat.   Lung cancer screening: Indicated - yes - Low dose CT last completed 2/2024, due this year.  AAA screening: Indicated - yes - Ultrasound last completed never  Hep C and HIV one time screening: never done  Tobacco: pack and a half currently, does think about cutting back, 40 years now      Acute conditions:  Left knee pain  1-2 weeks now, gets swollen through the day  No known trigger  Has been giving out on him  No previous injuries to the knee  No fevers or chills  Left arm lesions  Significant sun exposure with work  Not changing sizes  Multiple years now  Scaly on top, can pick and some bleeding beneath  Purple/brown and irregular shaped    Chronic Conditions:  T2DM  Hardly ever checking  Does not have monitor  Metformin 500mg BID, Insulin glargine 20 units daily, jardiance 10mg daily  HLD  Crestor 10mg daily  Will recheck labs, been overdue, last in 2/2024 at 39 for LDL  Depression  Celexa 20mg daily  Stable, refill needed    Medications Ordered Prior to Encounter[1]     Allergies[2]      There is no immunization history on file for this patient.      Review of Systems  All pertinent positive symptoms are included in the history of present illness.  All other systems have been reviewed and are negative and noncontributory to this patient's current ailments.     Objective   /88   Pulse 74   Ht 1.829 m (6')   Wt 106 kg (233 lb)   SpO2 99%   BMI 31.60 kg/m²   BSA: 2.32 meters squared  No visits with results within 1 Month(s) from this visit.   Latest known visit with results is:   Lab on 09/20/2024   Component Date Value Ref Range Status  "   Hemoglobin A1C 09/20/2024 6.8 (H)  See comment % Final    Estimated Average Glucose 09/20/2024 148  Not Established mg/dL Final       Physical Exam  CONSTITUTIONAL - well nourished, well developed, looks like stated age, in no acute distress, not ill-appearing, and not tired appearing  SKIN - normal skin color and pigmentation, normal skin turgor without rash, lesions, or nodules visualized  HEAD - no trauma, normocephalic  EYES - extraocular muscles are intact, and normal external exam  CHEST - clear to auscultation, no wheezing, no crackles and no rales, good effort  CARDIAC - regular rate and regular rhythm, no skipped beats, no murmur  EXTREMITIES - no edema, no deformities, +Left knee: thessaly negative, fatou positive medial and lateral, anterior and posterior drawer negative, varus and valgus negative, mild joint effusion, no erythema   NEUROLOGICAL - normal gait, normal balance, normal motor, no ataxia; alert, oriented and no focal signs  PSYCHIATRIC - alert, pleasant and cordial, age-appropriate      Assessment/Plan   Diagnoses and all orders for this visit:  Annual physical exam  Depression, unspecified depression type  -     citalopram (CeleXA) 20 mg tablet; Take 1 tablet (20 mg) by mouth once daily.  Need for lipid screening  -     Lipid panel; Future  Type 2 diabetes mellitus without complication, unspecified whether long term insulin use  -     Hemoglobin A1c; Future  -     pen needle, diabetic (BD Ultra-Fine Short Pen Needle) 31 gauge x 5/16\" needle; Inject 1 each under the skin once daily.  -     Albumin-Creatinine Ratio, Urine Random; Future  Screening for human immunodeficiency virus  -     HIV 1/2 Antigen/Antibody Screen with Reflex to Confirmation; Future  Screening for lung cancer  -     CT lung screening low dose; Future  Need for hepatitis C screening test  -     Hepatitis C antibody; Future  Colon cancer screening  -     Cologuard® colon cancer screening; Future  Acute pain of left " "knee  -     XR knee left 3 views; Future    You may have injured your meniscus in the right knee, but also have findings suggesting arthritis. We will start with an x-ray of the knee, and can consider MRI if no significant arthritis appears. Can consider Ortho referral or steroid injections in the future.    Placed referral for dermatology for routine skin checks. Your arm lesion is likely an actinic keratosis and will require further management.    Continue current medications for your diabetes, cholesterol and depression.    Please complete all ordered lab work and screenings. Will reach out to you if the results warrant any change in management.     Discussed the plan of care with the patient and they provided their understanding. All questions dicussed and answered during visit.         [1]   Current Outpatient Medications on File Prior to Visit   Medication Sig Dispense Refill    BD Ultra-Fine Short Pen Needle 31 gauge x 5/16\" needle once daily.      insulin glargine-lixisenatide (Soliqua 100/33) 100 unit-33 mcg/mL insulin pen INJECT 20 UNITS ONCE DAILY 3 mL 2    Jardiance 10 mg TAKE 1 TABLET BY MOUTH EVERY DAY 90 tablet 2    metFORMIN  mg 24 hr tablet TAKE 1 TABLET BY MOUTH TWICE A  tablet 1    methocarbamol (Robaxin) 750 mg tablet TAKE 1 TABLET BY MOUTH EVERY 6 HOURS AS NEEDED FOR UP TO 3 DAYS.      rosuvastatin (Crestor) 10 mg tablet TAKE 1 TABLET BY MOUTH EVERY DAY 60 tablet 2    [DISCONTINUED] citalopram (CeleXA) 20 mg tablet TAKE 1 TABLET BY MOUTH EVERY DAY 90 tablet 1    [DISCONTINUED] pen needle, diabetic (BD Ultra-Fine Short Pen Needle) 31 gauge x 5/16\" needle Inject 1 each under the skin once daily. 100 each 3    blood-glucose sensor (Dexcom G7 Sensor) device Use as directed. 1 each 3     No current facility-administered medications on file prior to visit.   [2] No Known Allergies    "

## 2025-07-25 ENCOUNTER — HOSPITAL ENCOUNTER (OUTPATIENT)
Dept: RADIOLOGY | Facility: HOSPITAL | Age: 65
Discharge: HOME | End: 2025-07-25
Payer: COMMERCIAL

## 2025-07-25 DIAGNOSIS — M25.562 ACUTE PAIN OF LEFT KNEE: ICD-10-CM

## 2025-07-25 PROCEDURE — 73564 X-RAY EXAM KNEE 4 OR MORE: CPT | Mod: LT

## 2025-07-26 LAB
CHOLEST SERPL-MCNC: 106 MG/DL
CHOLEST/HDLC SERPL: 2.5 (CALC)
EST. AVERAGE GLUCOSE BLD GHB EST-MCNC: 163 MG/DL
EST. AVERAGE GLUCOSE BLD GHB EST-SCNC: 9 MMOL/L
HBA1C MFR BLD: 7.3 %
HCV AB SERPL QL IA: NORMAL
HDLC SERPL-MCNC: 42 MG/DL
HIV 1+2 AB+HIV1 P24 AG SERPL QL IA: NORMAL
HIV 1+2 AB+HIV1 P24 AG SERPL QL IA: NORMAL
LDLC SERPL CALC-MCNC: 43 MG/DL (CALC)
NONHDLC SERPL-MCNC: 64 MG/DL (CALC)
TRIGL SERPL-MCNC: 129 MG/DL

## 2025-07-28 DIAGNOSIS — M17.12 LOCALIZED OSTEOARTHRITIS OF LEFT KNEE: Primary | ICD-10-CM

## 2025-08-01 DIAGNOSIS — E11.9 TYPE 2 DIABETES MELLITUS WITHOUT COMPLICATION, WITHOUT LONG-TERM CURRENT USE OF INSULIN: ICD-10-CM

## 2025-08-01 RX ORDER — METFORMIN HYDROCHLORIDE 500 MG/1
500 TABLET, EXTENDED RELEASE ORAL 2 TIMES DAILY
Qty: 120 TABLET | Refills: 1 | Status: SHIPPED | OUTPATIENT
Start: 2025-08-01

## 2025-08-01 RX ORDER — EMPAGLIFLOZIN 10 MG/1
10 TABLET, FILM COATED ORAL DAILY
Qty: 60 TABLET | Refills: 4 | Status: SHIPPED | OUTPATIENT
Start: 2025-08-01

## 2025-08-05 ENCOUNTER — HOSPITAL ENCOUNTER (OUTPATIENT)
Dept: RADIOLOGY | Facility: HOSPITAL | Age: 65
Discharge: HOME | End: 2025-08-05
Payer: COMMERCIAL

## 2025-08-05 ENCOUNTER — OFFICE VISIT (OUTPATIENT)
Dept: ORTHOPEDIC SURGERY | Facility: CLINIC | Age: 65
End: 2025-08-05
Payer: COMMERCIAL

## 2025-08-05 ENCOUNTER — APPOINTMENT (OUTPATIENT)
Dept: RADIOLOGY | Facility: HOSPITAL | Age: 65
End: 2025-08-05
Payer: COMMERCIAL

## 2025-08-05 DIAGNOSIS — Z12.2 SCREENING FOR LUNG CANCER: ICD-10-CM

## 2025-08-05 DIAGNOSIS — M17.12 ARTHRITIS OF LEFT KNEE: ICD-10-CM

## 2025-08-05 DIAGNOSIS — M25.562 ACUTE PAIN OF LEFT KNEE: Primary | ICD-10-CM

## 2025-08-05 PROCEDURE — 20610 DRAIN/INJ JOINT/BURSA W/O US: CPT

## 2025-08-05 PROCEDURE — 99203 OFFICE O/P NEW LOW 30 MIN: CPT

## 2025-08-05 PROCEDURE — 71271 CT THORAX LUNG CANCER SCR C-: CPT | Performed by: RADIOLOGY

## 2025-08-05 PROCEDURE — 71271 CT THORAX LUNG CANCER SCR C-: CPT

## 2025-08-05 RX ORDER — LIDOCAINE HYDROCHLORIDE 5 MG/ML
4 INJECTION, SOLUTION INFILTRATION; PERINEURAL
Status: COMPLETED | OUTPATIENT
Start: 2025-08-05 | End: 2025-08-05

## 2025-08-05 RX ORDER — TRIAMCINOLONE ACETONIDE 40 MG/ML
40 INJECTION, SUSPENSION INTRA-ARTICULAR; INTRAMUSCULAR
Status: COMPLETED | OUTPATIENT
Start: 2025-08-05 | End: 2025-08-05

## 2025-08-05 RX ADMIN — LIDOCAINE HYDROCHLORIDE 4 ML: 5 INJECTION, SOLUTION INFILTRATION; PERINEURAL at 09:33

## 2025-08-05 RX ADMIN — TRIAMCINOLONE ACETONIDE 40 MG: 40 INJECTION, SUSPENSION INTRA-ARTICULAR; INTRAMUSCULAR at 09:33

## 2025-08-05 ASSESSMENT — PAIN - FUNCTIONAL ASSESSMENT: PAIN_FUNCTIONAL_ASSESSMENT: 0-10

## 2025-08-05 ASSESSMENT — PAIN SCALES - GENERAL: PAINLEVEL_OUTOF10: 7

## 2025-08-05 NOTE — PROGRESS NOTES
HPI  Curtis Shah is a 65 y.o. male  in office today for   Chief Complaint   Patient presents with    Left Knee - Pain   .  he states that he has had increased left knee pain for about a month.  Denies any injury or trauma that he can recall.  Has fallen on knee, but doesn't remember the pain coinciding with a particular fall.  Pain along the joint line.  He had x-rays ordered by his PCP, no fracture.  He has been taking Advil which is not helping.    Past Medical History: DM, known right knee arthritis and meniscus tear    Medication  Medications Ordered Prior to Encounter[1]    Physical Exam  Constitutional: well developed, well nourished male in no acute distress  Psychiatric: normal mood, appropriate affect  Eyes: sclera anicteric  HENT: normocephalic/atraumatic  CV: regular rate and rhythm   Respiratory: non labored breathing  Integumentary: no rash  Neurological: moves all extremities    Left Knee Exam     Tenderness   The patient is experiencing tenderness in the medial joint line and lateral joint line.    Range of Motion   The patient has normal left knee ROM.    Tests   Dylan:  Medial - positive Lateral - negative  Varus: negative Valgus: negative  Drawer:  Anterior - negative     Posterior - negative    Other   Erythema: absent  Scars: absent  Sensation: normal  Swelling: mild              Imaging/Lab:  X-rays were taken 7/25/25 which were reviewed by myself and read by radiology and show No acute fracture or malalignment. Severe medial compartment osteoarthrosis with joint space loss and osteophytes. Large knee joint effusion. Soft tissues are unremarkable.    Lab Results   Component Value Date    HGBA1C 7.3 (H) 07/25/2025       Patient ID: Curtis Shah is a 65 y.o. male.    L Inj/Asp: L knee on 8/5/2025 9:33 AM  Indications: pain  Details: 22 G needle, anterolateral approach  Medications: 40 mg triamcinolone acetonide 40 mg/mL; 4 mL lidocaine 5 mg/mL (0.5 %)  Outcome: tolerated well, no immediate  "complications  Procedure, treatment alternatives, risks and benefits explained, specific risks discussed. Consent was given by the patient. Immediately prior to procedure a time out was called to verify the correct patient, procedure, equipment, support staff and site/side marked as required. Patient was prepped and draped in the usual sterile fashion.           Assessment  Assessment: left knee pain, left knee arthritis    Plan  Plan:  History, physical exam, and imaging were reviewed with patient. Discussed there is concern he could have a meniscus tear given exam.  Discussed conservative options for meniscus and arthritis in that knee including RICE, antiinflammatories, injections, PT.  He would like to try an injection at this time.  The left knee was injected per procedure note above.  Would recommend PT if injection not helping  Follow Up: Patient to follow up as needed if pain persists or gets worse.      All questions were answered for the patient prior to end of exam and patient addressed their understanding.    Kristen Vargas PA-C  08/05/25         [1]   Current Outpatient Medications on File Prior to Visit   Medication Sig Dispense Refill    BD Ultra-Fine Short Pen Needle 31 gauge x 5/16\" needle once daily.      blood-glucose sensor (Dexcom G7 Sensor) device Use as directed. 1 each 3    citalopram (CeleXA) 20 mg tablet Take 1 tablet (20 mg) by mouth once daily. 90 tablet 1    insulin glargine-lixisenatide (Soliqua 100/33) 100 unit-33 mcg/mL insulin pen INJECT 20 UNITS ONCE DAILY 3 mL 2    Jardiance 10 mg tablet TAKE 1 TABLET BY MOUTH EVERY DAY 60 tablet 4    metFORMIN  mg 24 hr tablet TAKE 1 TABLET BY MOUTH TWICE A  tablet 1    methocarbamol (Robaxin) 750 mg tablet TAKE 1 TABLET BY MOUTH EVERY 6 HOURS AS NEEDED FOR UP TO 3 DAYS.      pen needle, diabetic (BD Ultra-Fine Short Pen Needle) 31 gauge x 5/16\" needle Inject 1 each under the skin once daily. 100 each 3    rosuvastatin (Crestor) 10 mg " tablet TAKE 1 TABLET BY MOUTH EVERY DAY 60 tablet 2    [DISCONTINUED] Jardiance 10 mg TAKE 1 TABLET BY MOUTH EVERY DAY 90 tablet 2    [DISCONTINUED] metFORMIN  mg 24 hr tablet TAKE 1 TABLET BY MOUTH TWICE A  tablet 1     No current facility-administered medications on file prior to visit.